# Patient Record
Sex: FEMALE | Race: ASIAN | URBAN - METROPOLITAN AREA
[De-identification: names, ages, dates, MRNs, and addresses within clinical notes are randomized per-mention and may not be internally consistent; named-entity substitution may affect disease eponyms.]

---

## 2018-10-30 ENCOUNTER — ROUTINE PRENATAL (OUTPATIENT)
Dept: OBGYN CLINIC | Age: 38
End: 2018-10-30

## 2018-10-30 VITALS
WEIGHT: 102 LBS | SYSTOLIC BLOOD PRESSURE: 119 MMHG | HEART RATE: 98 BPM | BODY MASS INDEX: 18.07 KG/M2 | DIASTOLIC BLOOD PRESSURE: 82 MMHG | HEIGHT: 63 IN

## 2018-10-30 DIAGNOSIS — N92.6 MISSED MENSES: Primary | ICD-10-CM

## 2018-10-30 NOTE — PROGRESS NOTES
41 y/o G1 with missed menses presents to the office for confirmation of pregnancy. She has no concern for bleeding or pain. She does not nausea, but denies excessive vomiting. Visit Vitals  /82   Pulse 98   Ht 5' 3\" (1.6 m)   Wt 102 lb (46.3 kg)   LMP 09/05/2018 (Exact Date)   BMI 18.07 kg/m²     Gen: [facility], NAD  See ultrasound report    A/p  Early IUP @ 8w0d, which is consistent with the LMP + 1 day. Start PNC   RTO 2 weeks. Recommend Centering.

## 2018-11-14 ENCOUNTER — ROUTINE PRENATAL (OUTPATIENT)
Dept: OBGYN CLINIC | Age: 38
End: 2018-11-14

## 2018-11-14 VITALS
DIASTOLIC BLOOD PRESSURE: 58 MMHG | SYSTOLIC BLOOD PRESSURE: 108 MMHG | BODY MASS INDEX: 17.89 KG/M2 | WEIGHT: 101 LBS | HEIGHT: 63 IN

## 2018-11-14 DIAGNOSIS — Z3A.10 PREGNANCY WITH 10 COMPLETED WEEKS GESTATION: Primary | ICD-10-CM

## 2018-11-14 DIAGNOSIS — Z34.01 ENCOUNTER FOR SUPERVISION OF NORMAL FIRST PREGNANCY IN FIRST TRIMESTER: ICD-10-CM

## 2018-11-14 NOTE — PROGRESS NOTES
Cynthia Hush    Ms. Vibha Rios presents today for her first prenatal visit. She has no concerns. Used the  phone to complete this interview. OB History      Para Term  AB Living    1              SAB TAB Ectopic Molar Multiple Live Births                       I have reviewed the patient's medical, obstetrical, social, and family histories, medications, and available lab results. Subjective:   She has no unusual complaints    Objective:   Initial Physical Exam (New OB)    GENERAL APPEARANCE: alert, well appearing, in no apparent distress  HEAD: normocephalic, atraumatic  MOUTH: mucous membranes moist, pharynx normal without lesions  THYROID: no thyromegaly or masses present  BREASTS: no masses noted, no significant tenderness, no palpable axillary nodes, no skin changes  LUNGS: clear to auscultation, no wheezes, rales or rhonchi, symmetric air entry  HEART: regular rate and rhythm, no murmurs  ABDOMEN: soft, nontender, nondistended, no abnormal masses, no epigastric pain and FHT present  EXTREMITIES: no redness or tenderness in the calves or thighs, no edema  SKIN: normal coloration and turgor, no rashes  LYMPH NODES: no adenopathy palpable  NEUROLOGIC: alert, oriented, normal speech, no focal findings or movement disorder noted    PELVIC EXAM: EXTERNAL GENITALIA: normal appearing vulva with no masses, tenderness or lesions  VAGINA: no abnormal discharge or lesions  CERVIX: no lesions or cervical motion tenderness  UTERUS: gravid and consistent with 10 weeks  ADNEXA: no masses palpable and nontender  OB EXAM PELVIMETRY: appears adequate    Assessment/Plan:   Normal pregnancy    Routine Prenatal care    ICD-10-CM ICD-9-CM    1.  Pregnancy with 10 completed weeks gestation Z3A.10 V22.2 CULTURE, URINE      HEMOGLOBIN FRACTIONATION      T PALLIDUM AB      RUBELLA AB, IGG      CBC WITH AUTOMATED DIFF      HEP B SURFACE AG      HIV 1/2 AG/AB, 4TH GENERATION,W RFLX CONFIRM TYPE & SCREEN      PAP IG, CT-NG TV HPV 16&18,45 ([id number], [id number])      PAP IG, CT-NG TV HPV 16&18,45 ([id number], [id number])      TYPE & SCREEN      HIV 1/2 AG/AB, 4TH GENERATION,W RFLX CONFIRM      HEP B SURFACE AG      CBC WITH AUTOMATED DIFF      RUBELLA AB, IGG      T PALLIDUM AB      HEMOGLOBIN FRACTIONATION      CULTURE, URINE      Practice orientation done. Pt. Contemplating genetics. RTO 4 weeks. The plan of care has been discussed with the patient. She has been given the opportunity to ask questions, which seem to have been answered satisfactorily.

## 2018-11-14 NOTE — PATIENT INSTRUCTIONS
Weeks 10 to 14 of Your Pregnancy: Care Instructions  Your Care Instructions    By weeks 10 to 14 of your pregnancy, the placenta has formed inside your uterus. It is possible to hear your baby's heartbeat with a special ultrasound device. Your baby's eyes can and do move. The arms and legs can bend. This is a good time to think about testing for birth defects. There are two types of tests: screening and diagnostic. Screening tests show the chance that a baby has a certain birth defect. They can't tell you for sure that your baby has a problem. Diagnostic tests show if a baby has a certain birth defect. It's your choice whether to have these tests. You and your partner can talk to your doctor or midwife about birth defects tests. Follow-up care is a key part of your treatment and safety. Be sure to make and go to all appointments, and call your doctor if you are having problems. It's also a good idea to know your test results and keep a list of the medicines you take. How can you care for yourself at home? Decide about tests  · You can have screening tests and diagnostic tests to check for birth defects. The decision to have a test for birth defects is personal. Think about your age, your chance of passing on a family disease, your need to know about any problems, and what you might do after you have the test results. ? Triple or quadruple (quad) blood tests. These screening tests can be done between 15 and 20 weeks of pregnancy. They check the amounts of three or four substances in your blood. The doctor looks at these test results, along with your age and other factors, to find out the chance that your baby may have certain problems. ? Amniocentesis. This diagnostic test is used to look for chromosomal problems in the baby's cells.  It can be done between 15 and 20 weeks of pregnancy, usually around week 16.  ? Nuchal translucency test. This test uses ultrasound to measure the thickness of the area at the back of the baby's neck. An increase in the thickness can be an early sign of Down syndrome. ? Chorionic villus sampling (CVS). This is a test that looks for certain genetic problems with your baby. The same genes that are in your baby are in the placenta. A small piece of the placenta is taken out and tested. This test is done when you are 10 to 13 weeks pregnant. Ease discomfort  · Slow down and take naps when you feel tired. · If your emotions swing, talk to someone. Crying, anxiety, and concentration problems are common. · If your gums bleed, try a softer toothbrush. If your gums are puffy and bleed a lot, see your dentist.  · If you feel dizzy:  ? Get up slowly after sitting or lying down. ? Drink plenty of fluids. ? Eat small snacks to keep your blood sugar stable. ? Put your head between your legs as though you were tying your shoelaces. ? Lie down with your legs higher than your head. Use pillows to prop up your feet. · If you have a headache:  ? Lie down. ? Ask your partner or a good friend for a neck massage. ? Try cool cloths over your forehead or across the back of your neck. ? Use acetaminophen (Tylenol) for pain relief. Do not use nonsteroidal anti-inflammatory drugs (NSAIDs), such as ibuprofen (Advil, Motrin) or naproxen (Aleve), unless your doctor says it is okay. · If you have a nosebleed, pinch your nose gently, and hold it for a short while. To prevent nosebleeds, try massaging a small dab of petroleum jelly, such as Vaseline, in your nostrils. · If your nose is stuffed up, try saline (saltwater) nose sprays. Do not use decongestant sprays. Care for your breasts  · Wear a bra that gives you good support. · Know that changes in your breasts are normal.  ? Your breasts may get larger and more tender. Tenderness usually gets better by 12 weeks. ? Your nipples may get darker and larger, and small bumps around your nipples may show more. ?  The veins in your chest and breasts may show more. · Don't worry about \"toughening'\" your nipples. Breastfeeding will naturally do this. Where can you learn more? Go to http://dede-elen.info/. Enter S416 in the search box to learn more about \"Weeks 10 to 14 of Your Pregnancy: Care Instructions. \"  Current as of: November 21, 2017  Content Version: 11.8  © 4349-1160 [employer]. Care instructions adapted under license by [employer] (which disclaims liability or warranty for this information). If you have questions about a medical condition or this instruction, always ask your healthcare professional. [address] any warranty or liability for your use of this information.

## 2018-11-15 LAB
ABSOLUTE LYMPHOCYTE COUNT, 10803: 1.6 K/UL (ref 1–4.8)
BASOPHILS # BLD: 0 K/UL (ref 0–0.2)
BASOPHILS NFR BLD: 0 % (ref 0–2)
CHLAMYDIA TRACHOMATIS THINPREP, 13342: NEGATIVE
EOSINOPHIL # BLD: 0 K/UL (ref 0–0.5)
EOSINOPHIL NFR BLD: 0 % (ref 0–6)
ERYTHROCYTE [DISTWIDTH] IN BLOOD BY AUTOMATED COUNT: 13.6 % (ref 10–15.5)
GRANULOCYTES,GRANS: 73 % (ref 40–75)
HCT VFR BLD AUTO: 37.3 % (ref 35.1–46.5)
HEP B SURFACE AG SCRN, 006510: NORMAL
HGB BLD-MCNC: 12.1 G/DL (ref 11.7–15.5)
HIV -1/0/2 AG/AB WITH REFLEX, 13463: NON REACTIVE
HIV 1 & 2 AB SER-IMP: NORMAL
LYMPHOCYTES, LYMLT: 19 % (ref 20–45)
MCH RBC QN AUTO: 28 PG (ref 26–34)
MCHC RBC AUTO-ENTMCNC: 32 G/DL (ref 31–36)
MCV RBC AUTO: 85 FL (ref 80–95)
MONOCYTES # BLD: 0.7 K/UL (ref 0.1–1)
MONOCYTES NFR BLD: 9 % (ref 3–12)
NEISSERIA GONORRHOEAE THINPREP, 13343: NEGATIVE
NEUTROPHILS # BLD AUTO: 6.1 K/UL (ref 1.8–7.7)
PAP IMAGE GUIDED, 8900296: NORMAL
PLATELET # BLD AUTO: 237 K/UL (ref 140–440)
PMV BLD AUTO: 10.8 FL (ref 9–13)
RBC # BLD AUTO: 4.4 M/UL (ref 3.8–5.2)
RUBV IGG SERPL IA-ACNC: 2.5 AI
TRICHOMONAS NUC AMP-THIN PREP,13357: NEGATIVE
WBC # BLD AUTO: 8.5 K/UL (ref 4–11)

## 2018-11-16 LAB
HPV HIGH RISK, 507303: NEGATIVE
RESULT: NORMAL

## 2018-11-19 LAB
HEMOGLOBIN A1,HGBE1: 62.2 % (ref 96–98)
HEMOGLOBIN A2,HGBE2: 0 % (ref 2–4)
HEMOGLOBIN C,HGBE5: 0 % (ref 0–0)
HEMOGLOBIN D, 7336: 0 % (ref 0–0)
HEMOGLOBIN ELECTROPHORESIS INTERPRETATION, 406: ABNORMAL
HEMOGLOBIN F,HGBE3: 0 % (ref 0–0.1)
HEMOGLOBIN S SCREEN, 7338: ABNORMAL
HEMOGLOBIN S,HGBE4: 0 % (ref 0–0)
HEMOGLOBIN UNKNOWN, 7337: 37.8 % (ref 0–0)

## 2018-11-20 LAB
ABO + RH BLD: NORMAL
ANTIBODY SCREEN INTERPRETATION, 14017: NEGATIVE
TREPONEMA PALLIDUM AB: NON REACTIVE

## 2018-12-12 ENCOUNTER — ROUTINE PRENATAL (OUTPATIENT)
Dept: OBGYN CLINIC | Age: 38
End: 2018-12-12

## 2018-12-12 VITALS
BODY MASS INDEX: 17.61 KG/M2 | HEART RATE: 98 BPM | OXYGEN SATURATION: 100 % | HEIGHT: 63 IN | DIASTOLIC BLOOD PRESSURE: 71 MMHG | SYSTOLIC BLOOD PRESSURE: 102 MMHG | WEIGHT: 99.4 LBS

## 2018-12-12 DIAGNOSIS — Z3A.14 14 WEEKS GESTATION OF PREGNANCY: Primary | ICD-10-CM

## 2018-12-12 DIAGNOSIS — O09.522 ADVANCED MATERNAL AGE IN MULTIGRAVIDA, SECOND TRIMESTER: ICD-10-CM

## 2018-12-12 LAB
BILIRUB UR QL STRIP: NEGATIVE
GLUCOSE UR-MCNC: NEGATIVE MG/DL
KETONES P FAST UR STRIP-MCNC: NEGATIVE MG/DL
PH UR STRIP: 7 [PH] (ref 4.6–8)
PROT UR QL STRIP: NEGATIVE
SP GR UR STRIP: 1.01 (ref 1–1.03)
UA UROBILINOGEN AMB POC: NORMAL (ref 0.2–1)
URINALYSIS CLARITY POC: CLEAR
URINALYSIS COLOR POC: YELLOW
URINE BLOOD POC: NEGATIVE
URINE LEUKOCYTES POC: NEGATIVE
URINE NITRITES POC: NEGATIVE

## 2018-12-12 NOTE — PROGRESS NOTES
FLU VACCINE GIVE TO THIS PATIENT PER  order injection placed  THE RIGHT arm .  LOT # [id number], EXPIRES 06/30/19 : [address] # 29854-281-01     VACCINE INFORMATION SHEET GIVEN TO THIS PATIENT

## 2018-12-12 NOTE — PROGRESS NOTES
Ms. Ellouise Blizzard is a   14w0d with Estimated Date of Delivery: 19 presents to the office for a routine  prenatal visit. She denies abdominal pain or cramping, leakage of fluid, or vaginal bleeding. Visit Vitals  /71 (BP 1 Location: Right arm, BP Patient Position: Sitting)   Pulse 98   Ht 5' 3\" (1.6 m)   Wt 99 lb 6.4 oz (45.1 kg)   LMP 2018 (Exact Date)   SpO2 100%   BMI 17.61 kg/m²       A/P  14w0d IUP, normal prenatal visit. 1. Continue routine prenatal care  2. Strict  labor precautions given. Advised regarding leakage of fluid, uterine cramping, and vaginal bleeding  3. F/U in 5 weeks with Morphology  4. Flu shot today  5. AFP next visit  6. Patient [place]. States that she and  are leaving in February.